# Patient Record
Sex: MALE | Race: WHITE | NOT HISPANIC OR LATINO | Employment: FULL TIME | URBAN - METROPOLITAN AREA
[De-identification: names, ages, dates, MRNs, and addresses within clinical notes are randomized per-mention and may not be internally consistent; named-entity substitution may affect disease eponyms.]

---

## 2017-01-20 ENCOUNTER — OFFICE VISIT (OUTPATIENT)
Dept: INTERNAL MEDICINE | Facility: MEDICAL CENTER | Age: 52
End: 2017-01-20
Payer: MEDICAID

## 2017-01-20 VITALS
WEIGHT: 254.6 LBS | HEART RATE: 70 BPM | SYSTOLIC BLOOD PRESSURE: 146 MMHG | RESPIRATION RATE: 20 BRPM | TEMPERATURE: 97.7 F | BODY MASS INDEX: 37.71 KG/M2 | HEIGHT: 69 IN | DIASTOLIC BLOOD PRESSURE: 100 MMHG | OXYGEN SATURATION: 93 %

## 2017-01-20 DIAGNOSIS — E66.9 OBESITY, UNSPECIFIED OBESITY SEVERITY, UNSPECIFIED OBESITY TYPE: ICD-10-CM

## 2017-01-20 DIAGNOSIS — I10 ESSENTIAL HYPERTENSION: ICD-10-CM

## 2017-01-20 DIAGNOSIS — Z13.1 DM (DIABETES MELLITUS SCREEN): ICD-10-CM

## 2017-01-20 DIAGNOSIS — Z72.0 TOBACCO ABUSE: ICD-10-CM

## 2017-01-20 PROCEDURE — 99204 OFFICE O/P NEW MOD 45 MIN: CPT | Mod: GC | Performed by: INTERNAL MEDICINE

## 2017-01-20 RX ORDER — LISINOPRIL 10 MG/1
10 TABLET ORAL DAILY
COMMUNITY
End: 2017-01-20 | Stop reason: SDUPTHER

## 2017-01-20 RX ORDER — AMLODIPINE BESYLATE 5 MG/1
5 TABLET ORAL DAILY
COMMUNITY
End: 2017-01-20 | Stop reason: SDUPTHER

## 2017-01-20 RX ORDER — AMLODIPINE BESYLATE 5 MG/1
5 TABLET ORAL DAILY
Qty: 30 TAB | Refills: 6 | Status: SHIPPED | OUTPATIENT
Start: 2017-01-20 | End: 2017-09-29

## 2017-01-20 RX ORDER — METOPROLOL SUCCINATE 200 MG/1
200 TABLET, EXTENDED RELEASE ORAL DAILY
Qty: 30 TAB | Refills: 6 | Status: SHIPPED | OUTPATIENT
Start: 2017-01-20 | End: 2017-09-29 | Stop reason: SDUPTHER

## 2017-01-20 RX ORDER — LISINOPRIL 10 MG/1
10 TABLET ORAL DAILY
Qty: 30 TAB | Refills: 6 | Status: SHIPPED | OUTPATIENT
Start: 2017-01-20 | End: 2017-09-29 | Stop reason: SDUPTHER

## 2017-01-20 RX ORDER — METOPROLOL SUCCINATE 200 MG/1
TABLET, EXTENDED RELEASE ORAL
COMMUNITY
Start: 2016-12-19 | End: 2017-01-20 | Stop reason: SDUPTHER

## 2017-01-20 RX ORDER — NICOTINE 21 MG/24HR
1 PATCH, TRANSDERMAL 24 HOURS TRANSDERMAL EVERY 24 HOURS
Qty: 30 PATCH | Refills: 3 | Status: SHIPPED | OUTPATIENT
Start: 2017-01-20 | End: 2017-09-29

## 2017-01-20 ASSESSMENT — ENCOUNTER SYMPTOMS
SHORTNESS OF BREATH: 0
BLURRED VISION: 0
DIAPHORESIS: 0
TINGLING: 0
HEADACHES: 0
CHILLS: 0
ABDOMINAL PAIN: 0
NECK PAIN: 0
DIZZINESS: 0
VOMITING: 0
FEVER: 0
MYALGIAS: 0
ORTHOPNEA: 0
COUGH: 0
BACK PAIN: 0
DEPRESSION: 0
NAUSEA: 0
SPUTUM PRODUCTION: 0
FOCAL WEAKNESS: 0

## 2017-01-20 ASSESSMENT — PATIENT HEALTH QUESTIONNAIRE - PHQ9: CLINICAL INTERPRETATION OF PHQ2 SCORE: 0

## 2017-01-20 NOTE — PROGRESS NOTES
New Patient to Establish    Reason to establish: HTN, DM    CC: Patient came to establish with us    HPI:   51-year-old male came to the clinic to establish with us.    Hypertension; patient was diagnosed with hypertension many years ago. He is not checking his blood pressure at home. He is  compliance with the medication. He is not doing exercise. He is on beta blocker, amlodipine and lisinopril. Denies any alarm symptoms.    Diabetes; patient states that he was diagnosed with diabetes one year ago. He is not checking his blood glucose at home. He is compliance with the medication. He is on metformin and tradjenta. He is not doing exercise. Denies any numbness in hands and feet. Patient states that eye exam was done recently for eyeglasses and they told him that there is no diabetic retinopathy.    Obesity; patient states that he lost around 40 pounds in 6 months. He is a . Currently he is not doing any exercise.    Tobacco use: One pack per day for 25 year. Patient states that he tried nicotine in the past and it didn't help him.  Patient Active Problem List    Diagnosis Date Noted   • Essential hypertension 01/20/2017   • DM (diabetes mellitus screen) 01/20/2017   • Tobacco abuse 01/20/2017   • Pneumonia 08/08/2013       Past Medical History   Diagnosis Date   • Hypertension    • Hernia, umbilical        Current Outpatient Prescriptions   Medication Sig Dispense Refill   • linagliptin (TRADJENTA) 5 MG Tab tablet Take 1 Tab by mouth every day. 30 Tab 6   • amlodipine (NORVASC) 5 MG Tab Take 1 Tab by mouth every day. 30 Tab 6   • metformin (GLUCOPHAGE) 500 MG Tab Take 1 Tab by mouth 2 times a day, with meals. 60 Tab 6   • lisinopril (PRINIVIL) 10 MG Tab Take 1 Tab by mouth every day. 30 Tab 6   • metoprolol (TOPROL-XL) 200 MG XL tablet Take 1 Tab by mouth every day. 30 Tab 6   • nicotine (NICODERM CQ) 21 MG/24HR PATCH 24 HR Apply 1 Patch to skin as directed every 24 hours. 30 Patch 3     No current  "facility-administered medications for this visit.       Allergies as of 01/20/2017   • (No Known Allergies)       Social History     Social History   • Marital Status:      Spouse Name: N/A   • Number of Children: N/A   • Years of Education: N/A     Occupational History   • Not on file.     Social History Main Topics   • Smoking status: Current Every Day Smoker -- 2.00 packs/day for 40 years     Types: Cigarettes   • Smokeless tobacco: Never Used   • Alcohol Use: Yes      Comment: occ   • Drug Use: No   • Sexual Activity: Not on file     Other Topics Concern   • Not on file     Social History Narrative       Family History   Problem Relation Age of Onset   • Heart Disease Father    • Stroke Father        History reviewed. No pertinent past surgical history.    ROS: As per HPI. Additional pertinent symptoms as noted below.    Review of Systems   Constitutional: Negative for fever, chills, malaise/fatigue and diaphoresis.   Eyes: Negative for blurred vision.   Respiratory: Negative for cough, sputum production and shortness of breath.    Cardiovascular: Negative for chest pain, orthopnea and leg swelling.   Gastrointestinal: Negative for nausea, vomiting and abdominal pain.   Genitourinary: Negative for dysuria and urgency.   Musculoskeletal: Negative for myalgias, back pain and neck pain.   Skin: Negative for itching and rash.   Neurological: Negative for dizziness, tingling, focal weakness and headaches.   Psychiatric/Behavioral: Negative for depression and suicidal ideas.       /100 mmHg  Pulse 70  Temp(Src) 36.5 °C (97.7 °F)  Resp 20  Ht 1.74 m (5' 8.5\")  Wt 115.486 kg (254 lb 9.6 oz)  BMI 38.14 kg/m2  SpO2 93%    Physical Exam  General:  Alert and oriented, No apparent distress.    Eyes: Pupils equal and reactive. No scleral icterus.    Throat: Clear no erythema or exudates noted.    Neck: Supple.     Lungs: Clear to auscultation and percussion bilaterally.    Cardiovascular: Regular rate and " rhythm. No murmurs, rubs or gallops.    Abdomen:  Benign. No rebound or guarding noted.    Extremities: No clubbing, cyanosis, edema.    Skin: Clear. No rash or suspicious skin lesions noted.     Neuro: Alert and oriented x3       Assessment and Plan    1. Essential hypertension  -Stable on this visit. Due to history of diabetes we'll try to keep his systolic blood pressure less than 140  -Patient denied any alarm symptoms on this visit  -Order CBC and chem panel  -Advised to check blood pressure daily and bring log to next appointment  -Advised to take low salt diet and exercise  -Continue beta blocker, lisinopril and amlodipine    2. DM diabetes mellitus screen)  -Advised to check blood glucose daily  -Advised to bring log to next appointment  -Continue metformin and Tradjenta  -Order CBC, chem panel, microalbumin creatinine ratio and lipid panel    3. Tobacco abuse  -Encouraged to stop smoking on this visit. Extensive counseling was provided. Started on nicotine patch. If nicotine patch did not help then will consider to  Start Chantix       4.Obesity  -Encourage for exercise . Counseling was provided on this visit. Advised to cut down carb and fat in diet           Signed by: Marshal Langford M.D.

## 2017-01-20 NOTE — MR AVS SNAPSHOT
"        Tomy Isaac   2017 10:45 AM   Office Visit   MRN: [id number]    Department:  [facility] - Internal Med   Dept Phone:  [phone]    Description:  Male : 1965   Provider:  Marshal Langford M.D.           Reason for Visit     Diabetes review medications    Hypertension get establish      Allergies as of 2017     No Known Allergies      You were diagnosed with     Essential hypertension   [4665484]       DM (diabetes mellitus screen)   [367187]       Tobacco abuse   [[id number]]         Vital Signs     Blood Pressure Pulse Temperature Respirations Height Weight    146/100 mmHg 70 36.5 °C (97.7 °F) 20 1.74 m (5' 8.5\") 115.486 kg (254 lb 9.6 oz)    Body Mass Index Oxygen Saturation Smoking Status             38.14 kg/m2 93% Current Every Day Smoker         Basic Information     Date Of Birth Sex Race Ethnicity Preferred Language    1965 Male White Non- English      Your appointments     2017 11:00 AM   Established Patient with Marshal Langford M.D.   [facility] - Internal Medicine (--)    [address] [id number] [phone]           You will be receiving a confirmation call a few days before your appointment from our automated call confirmation system.              Problem List              ICD-10-CM Priority Class Noted - Resolved    Pneumonia J18.9   2013 - Present    Essential hypertension I10   2017 - Present    DM (diabetes mellitus screen) Z13.1   2017 - Present    Tobacco abuse Z72.0   2017 - Present      Health Maintenance        Date Due Completion Dates    IMM DTaP/Tdap/Td Vaccine (1 - Tdap) 1984 ---    COLONOSCOPY 2015 ---    IMM INFLUENZA (1) 2016 ---            Current Immunizations     No immunizations on file.      Below and/or attached are the medications your provider expects you to take. Review all of your home medications and newly ordered medications with your provider and/or " pharmacist. Follow medication instructions as directed by your provider and/or pharmacist. Please keep your medication list with you and share with your provider. Update the information when medications are discontinued, doses are changed, or new medications (including over-the-counter products) are added; and carry medication information at all times in the event of emergency situations     Allergies:  No Known Allergies          Medications  Valid as of: January 20, 2017 - 11:30 AM    Generic Name Brand Name Tablet Size Instructions for use    AmLODIPine Besylate (Tab) NORVASC 5 MG Take 1 Tab by mouth every day.        Linagliptin (Tab) TRADJENTA 5 MG Take 1 Tab by mouth every day.        Lisinopril (Tab) PRINIVIL 10 MG Take 1 Tab by mouth every day.        MetFORMIN HCl (Tab) GLUCOPHAGE 500 MG Take 1 Tab by mouth 2 times a day, with meals.        Metoprolol Succinate (TABLET SR 24 HR) TOPROL- MG Take 1 Tab by mouth every day.        Nicotine (PATCH 24 HR) NICODERM 21 MG/24HR Apply 1 Patch to skin as directed every 24 hours.        .                 Medicines prescribed today were sent to:     [facility] [address] [place] [address], [place] - [address]    [address] [facility] [place] [address]    Phone: [phone] Fax: [phone]    Open 24 Hours?: No      Medication refill instructions:       If your prescription bottle indicates you have medication refills left, it is not necessary to call your provider’s office. Please contact your pharmacy and they will refill your medication.    If your prescription bottle indicates you do not have any refills left, you may request refills at any time through one of the following ways: The online [employer] system (except Urgent Care), by calling your provider’s office, or by asking your pharmacy to contact your provider’s office with a refill request. Medication refills are processed only during regular business hours and may not be available until the next business day.  Your provider may request additional information or to have a follow-up visit with you prior to refilling your medication.   *Please Note: Medication refills are assigned a new Rx number when refilled electronically. Your pharmacy may indicate that no refills were authorized even though a new prescription for the same medication is available at the pharmacy. Please request the medicine by name with the pharmacy before contacting your provider for a refill.        Your To Do List     Future Labs/Procedures Complete By Expires    CBC WITH DIFFERENTIAL  As directed 1/20/2018    COMP METABOLIC PANEL  As directed 1/20/2018    HEMOGLOBIN A1C  As directed 1/20/2018    MICROALBUMIN CREAT RATIO URINE  As directed 1/20/2018         [email or web address] Access Code: Activation code not generated  Current [employer] Status: Active

## 2017-09-29 ENCOUNTER — OFFICE VISIT (OUTPATIENT)
Dept: INTERNAL MEDICINE | Facility: MEDICAL CENTER | Age: 52
End: 2017-09-29

## 2017-09-29 VITALS
HEART RATE: 71 BPM | HEIGHT: 68 IN | SYSTOLIC BLOOD PRESSURE: 150 MMHG | DIASTOLIC BLOOD PRESSURE: 99 MMHG | OXYGEN SATURATION: 94 % | BODY MASS INDEX: 38.98 KG/M2 | TEMPERATURE: 97.9 F | WEIGHT: 257.2 LBS

## 2017-09-29 DIAGNOSIS — E66.9 OBESITY (BMI 35.0-39.9 WITHOUT COMORBIDITY): ICD-10-CM

## 2017-09-29 DIAGNOSIS — Z72.0 TOBACCO ABUSE: ICD-10-CM

## 2017-09-29 DIAGNOSIS — I10 ESSENTIAL HYPERTENSION: ICD-10-CM

## 2017-09-29 DIAGNOSIS — Z13.1 DM (DIABETES MELLITUS SCREEN): ICD-10-CM

## 2017-09-29 DIAGNOSIS — E66.9 OBESITY, UNSPECIFIED OBESITY SEVERITY, UNSPECIFIED OBESITY TYPE: ICD-10-CM

## 2017-09-29 PROCEDURE — 99204 OFFICE O/P NEW MOD 45 MIN: CPT | Mod: GC | Performed by: INTERNAL MEDICINE

## 2017-09-29 RX ORDER — LISINOPRIL 10 MG/1
10 TABLET ORAL DAILY
Qty: 30 TAB | Refills: 6 | Status: SHIPPED | OUTPATIENT
Start: 2017-09-29

## 2017-09-29 RX ORDER — METOPROLOL SUCCINATE 200 MG/1
200 TABLET, EXTENDED RELEASE ORAL DAILY
Qty: 30 TAB | Refills: 6 | Status: SHIPPED | OUTPATIENT
Start: 2017-09-29

## 2017-09-29 ASSESSMENT — PAIN SCALES - GENERAL: PAINLEVEL: NO PAIN

## 2017-09-29 NOTE — PROGRESS NOTES
New Patient to Establish    Reason to establish: New patient to establish    CC: Establish care, hypertension    HPI: Mr. Isaac is a 52-year-old male who presents to the clinic with his daughter to establish care with a new physician, patient states he was being followed at [place] with another PCP however PCP has retired. Patient was previously being treated for hypertension and diabetes mellitus type II.    Patient states that he does not have insurance and is self-pay, this has created some difficulties for him to obtain certain medications that were prescribed to him. However, patient states that he has been taking metoprolol, lisinopril, and metformin, he states that his blood pressure normally ranges in the 130s to 150s, systolic, and his blood sugar normally ranges in the 150's.     Patient does not have any recent labs, and his blood pressure on this visit was 150/99.    Patient is a .    Patient states that he currently smokes about a pack a day and has been smoking since he was 10 years old. Patient's daughter also smokes.    Patient states that he has no other medical complaints at this time.     As previously stated patient is self-pay, this has brought up difficulties in treating this patient. Patient states that he cannot follow up in one month as I previously suggested and will follow-up in 2 months. Patient also states that it might be difficult for him to obtain labs due to his financial situation.    Patient Active Problem List    Diagnosis Date Noted   • Essential hypertension 01/20/2017   • DM (diabetes mellitus screen) 01/20/2017   • Tobacco abuse 01/20/2017   • Obesity 01/20/2017       Past Medical History:   Diagnosis Date   • Hernia, umbilical    • Hypertension        Current Outpatient Prescriptions   Medication Sig Dispense Refill   • metoprolol (TOPROL-XL) 200 MG XL tablet Take 1 Tab by mouth every day. 30 Tab 6   • lisinopril (PRINIVIL) 10 MG Tab Take 1 Tab by mouth  "every day. 30 Tab 6   • metformin (GLUCOPHAGE) 500 MG Tab Take 1 Tab by mouth 2 times a day, with meals. 60 Tab 6     No current facility-administered medications for this visit.        Allergies as of 09/29/2017   • (No Known Allergies)       Social History     Social History   • Marital status:      Spouse name: N/A   • Number of children: N/A   • Years of education: N/A     Occupational History   • Not on file.     Social History Main Topics   • Smoking status: Current Every Day Smoker     Packs/day: 1.00     Years: 40.00     Types: Cigarettes   • Smokeless tobacco: Never Used   • Alcohol use Yes      Comment: occ   • Drug use: No   • Sexual activity: Not Currently     Other Topics Concern   • Not on file     Social History Narrative   • No narrative on file       Family History   Problem Relation Age of Onset   • Heart Disease Father    • Stroke Father    • Diabetes Father        No past surgical history on file.    ROS: As per HPI. Additional pertinent symptoms as noted below.    All others negative    /99   Pulse 71   Temp 36.6 °C (97.9 °F)   Ht 1.727 m (5' 8\")   Wt 116.7 kg (257 lb 3.2 oz)   SpO2 94%   BMI 39.11 kg/m²     Physical Exam  General:  Alert and oriented, No apparent distress.    Eyes: Pupils equal and reactive. No scleral icterus.    Throat: Clear no erythema or exudates noted.    Neck: Supple. No lymphadenopathy noted. Thyroid not enlarged.    Lungs: Clear to auscultation and percussion bilaterally.    Cardiovascular: Regular rate and rhythm. No murmurs, rubs or gallops.    Abdomen:  Benign. No rebound or guarding noted.    Extremities: No clubbing, cyanosis, edema.    Skin: Clear. No rash or suspicious skin lesions noted.    Other:    Note: I have reviewed all pertinent labs and diagnostic tests associated with this visit with specific comments listed under the assessment and plan below    Assessment and Plan    1. Hypertension  Patient states he has a history of hypertension " for which he was being followed by his previous PCP, blood pressure on this visit was 150/99.  Plan  -Refill patient's lisinopril 10 mg and metoprolol 200 mg.  -Patient was counseled to stop smoking  -Patient was asked to have lipid panel and CMP drawn.  -Patient to follow-up in  2 months    2. Obesity  Patient has a BMI of 38.15.  Plan  -Patient was counseled on weight loss.    3. Tobacco use  Patient states that he has been smoking at least one pack a day for the previous 40 years. He was counseled today on tobacco use cessation.    4. Healthcare maintenance  Patient was asked to have a CBC, CMP, lipid panel, and hemoglobin A1c drawn. Patient denied vaccinations at this time due to money constraints.    5. Diabetes mellitus type II  Patient states that he is diabetic and was told that he had type II, was previously being treated with metformin. Patient states that his blood glucose normally ranges 150 with metformin.  Plan  -Since there are no labs to verify blood glucose patient was asked to have a hemoglobin A1c drawn and to follow-up in 2 months.      There are no diagnoses linked to this encounter.    Followup: Return in about 2 months (around 11/29/2017), or if symptoms worsen or fail to improve, for Short.    Risk Assessment (discuss potential complications a function of chronic problems): Patient seems to be higher risk due to obesity, hypertension, diabetes, and tobacco use    Complexity (discuss number of co-morbidities): Patient has several comorbidities as stated previously    Signed by: Jesus Thomas M.D.

## 2017-10-26 ENCOUNTER — HOSPITAL ENCOUNTER (EMERGENCY)
Facility: MEDICAL CENTER | Age: 52
End: 2017-10-27
Attending: EMERGENCY MEDICINE

## 2017-10-26 ENCOUNTER — APPOINTMENT (OUTPATIENT)
Dept: RADIOLOGY | Facility: MEDICAL CENTER | Age: 52
End: 2017-10-26
Attending: EMERGENCY MEDICINE

## 2017-10-26 DIAGNOSIS — N20.1 URETEROLITHIASIS: ICD-10-CM

## 2017-10-26 LAB
ALBUMIN SERPL BCP-MCNC: 4 G/DL (ref 3.2–4.9)
ALBUMIN/GLOB SERPL: 1.5 G/DL
ALP SERPL-CCNC: 68 U/L (ref 30–99)
ALT SERPL-CCNC: 20 U/L (ref 2–50)
ANION GAP SERPL CALC-SCNC: 8 MMOL/L (ref 0–11.9)
AST SERPL-CCNC: 20 U/L (ref 12–45)
BASOPHILS # BLD AUTO: 0.5 % (ref 0–1.8)
BASOPHILS # BLD: 0.06 K/UL (ref 0–0.12)
BILIRUB SERPL-MCNC: 1 MG/DL (ref 0.1–1.5)
BUN SERPL-MCNC: 14 MG/DL (ref 8–22)
CALCIUM SERPL-MCNC: 8.7 MG/DL (ref 8.4–10.2)
CHLORIDE SERPL-SCNC: 109 MMOL/L (ref 96–112)
CO2 SERPL-SCNC: 23 MMOL/L (ref 20–33)
CREAT SERPL-MCNC: 1.25 MG/DL (ref 0.5–1.4)
EOSINOPHIL # BLD AUTO: 0.2 K/UL (ref 0–0.51)
EOSINOPHIL NFR BLD: 1.5 % (ref 0–6.9)
ERYTHROCYTE [DISTWIDTH] IN BLOOD BY AUTOMATED COUNT: 39.5 FL (ref 35.9–50)
GFR SERPL CREATININE-BSD FRML MDRD: >60 ML/MIN/1.73 M 2
GLOBULIN SER CALC-MCNC: 2.6 G/DL (ref 1.9–3.5)
GLUCOSE SERPL-MCNC: 153 MG/DL (ref 65–99)
HCT VFR BLD AUTO: 49.8 % (ref 42–52)
HGB BLD-MCNC: 17.9 G/DL (ref 14–18)
IMM GRANULOCYTES # BLD AUTO: 0.04 K/UL (ref 0–0.11)
IMM GRANULOCYTES NFR BLD AUTO: 0.3 % (ref 0–0.9)
LIPASE SERPL-CCNC: 25 U/L (ref 7–58)
LYMPHOCYTES # BLD AUTO: 1.51 K/UL (ref 1–4.8)
LYMPHOCYTES NFR BLD: 11.6 % (ref 22–41)
MCH RBC QN AUTO: 31.7 PG (ref 27–33)
MCHC RBC AUTO-ENTMCNC: 35.9 G/DL (ref 33.7–35.3)
MCV RBC AUTO: 88.3 FL (ref 81.4–97.8)
MONOCYTES # BLD AUTO: 0.56 K/UL (ref 0–0.85)
MONOCYTES NFR BLD AUTO: 4.3 % (ref 0–13.4)
NEUTROPHILS # BLD AUTO: 10.69 K/UL (ref 1.82–7.42)
NEUTROPHILS NFR BLD: 81.8 % (ref 44–72)
NRBC # BLD AUTO: 0 K/UL
NRBC BLD AUTO-RTO: 0 /100 WBC
PLATELET # BLD AUTO: 241 K/UL (ref 164–446)
PMV BLD AUTO: 10.2 FL (ref 9–12.9)
POTASSIUM SERPL-SCNC: 4.1 MMOL/L (ref 3.6–5.5)
PROT SERPL-MCNC: 6.6 G/DL (ref 6–8.2)
RBC # BLD AUTO: 5.64 M/UL (ref 4.7–6.1)
SODIUM SERPL-SCNC: 140 MMOL/L (ref 135–145)
TROPONIN I SERPL-MCNC: <0.02 NG/ML (ref 0–0.04)
WBC # BLD AUTO: 13.1 K/UL (ref 4.8–10.8)

## 2017-10-26 PROCEDURE — 85025 COMPLETE CBC W/AUTO DIFF WBC: CPT

## 2017-10-26 PROCEDURE — 96374 THER/PROPH/DIAG INJ IV PUSH: CPT

## 2017-10-26 PROCEDURE — 700102 HCHG RX REV CODE 250 W/ 637 OVERRIDE(OP): Performed by: EMERGENCY MEDICINE

## 2017-10-26 PROCEDURE — 71010 DX-CHEST-PORTABLE (1 VIEW): CPT

## 2017-10-26 PROCEDURE — 700117 HCHG RX CONTRAST REV CODE 255: Performed by: EMERGENCY MEDICINE

## 2017-10-26 PROCEDURE — 94760 N-INVAS EAR/PLS OXIMETRY 1: CPT

## 2017-10-26 PROCEDURE — 93005 ELECTROCARDIOGRAM TRACING: CPT | Performed by: EMERGENCY MEDICINE

## 2017-10-26 PROCEDURE — 84484 ASSAY OF TROPONIN QUANT: CPT

## 2017-10-26 PROCEDURE — 81001 URINALYSIS AUTO W/SCOPE: CPT

## 2017-10-26 PROCEDURE — 80053 COMPREHEN METABOLIC PANEL: CPT

## 2017-10-26 PROCEDURE — 83690 ASSAY OF LIPASE: CPT

## 2017-10-26 PROCEDURE — 74177 CT ABD & PELVIS W/CONTRAST: CPT

## 2017-10-26 PROCEDURE — 700111 HCHG RX REV CODE 636 W/ 250 OVERRIDE (IP): Performed by: EMERGENCY MEDICINE

## 2017-10-26 PROCEDURE — 36415 COLL VENOUS BLD VENIPUNCTURE: CPT

## 2017-10-26 PROCEDURE — 96375 TX/PRO/DX INJ NEW DRUG ADDON: CPT

## 2017-10-26 PROCEDURE — A9270 NON-COVERED ITEM OR SERVICE: HCPCS | Performed by: EMERGENCY MEDICINE

## 2017-10-26 PROCEDURE — 93005 ELECTROCARDIOGRAM TRACING: CPT

## 2017-10-26 PROCEDURE — 99285 EMERGENCY DEPT VISIT HI MDM: CPT

## 2017-10-26 RX ORDER — ONDANSETRON 2 MG/ML
4 INJECTION INTRAMUSCULAR; INTRAVENOUS ONCE
Status: COMPLETED | OUTPATIENT
Start: 2017-10-26 | End: 2017-10-26

## 2017-10-26 RX ORDER — TAMSULOSIN HYDROCHLORIDE 0.4 MG/1
0.4 CAPSULE ORAL ONCE
Status: COMPLETED | OUTPATIENT
Start: 2017-10-27 | End: 2017-10-26

## 2017-10-26 RX ORDER — MORPHINE SULFATE 4 MG/ML
4 INJECTION, SOLUTION INTRAMUSCULAR; INTRAVENOUS ONCE
Status: COMPLETED | OUTPATIENT
Start: 2017-10-26 | End: 2017-10-26

## 2017-10-26 RX ORDER — KETOROLAC TROMETHAMINE 30 MG/ML
30 INJECTION, SOLUTION INTRAMUSCULAR; INTRAVENOUS ONCE
Status: DISCONTINUED | OUTPATIENT
Start: 2017-10-27 | End: 2017-10-27 | Stop reason: HOSPADM

## 2017-10-26 RX ORDER — KETOROLAC TROMETHAMINE 30 MG/ML
30 INJECTION, SOLUTION INTRAMUSCULAR; INTRAVENOUS ONCE
Status: DISCONTINUED | OUTPATIENT
Start: 2017-10-27 | End: 2017-10-26

## 2017-10-26 RX ORDER — ONDANSETRON 4 MG/1
4 TABLET, ORALLY DISINTEGRATING ORAL ONCE
Status: COMPLETED | OUTPATIENT
Start: 2017-10-27 | End: 2017-10-26

## 2017-10-26 RX ADMIN — TAMSULOSIN HYDROCHLORIDE 0.4 MG: 0.4 CAPSULE ORAL at 23:38

## 2017-10-26 RX ADMIN — ONDANSETRON 4 MG: 4 TABLET, ORALLY DISINTEGRATING ORAL at 23:38

## 2017-10-26 RX ADMIN — IOHEXOL 100 ML: 350 INJECTION, SOLUTION INTRAVENOUS at 23:23

## 2017-10-26 RX ADMIN — ONDANSETRON 4 MG: 2 INJECTION, SOLUTION INTRAMUSCULAR; INTRAVENOUS at 22:00

## 2017-10-26 RX ADMIN — HYDROMORPHONE HYDROCHLORIDE 0.5 MG: 1 INJECTION, SOLUTION INTRAMUSCULAR; INTRAVENOUS; SUBCUTANEOUS at 23:41

## 2017-10-26 RX ADMIN — MORPHINE SULFATE 4 MG: 4 INJECTION INTRAVENOUS at 22:00

## 2017-10-26 ASSESSMENT — PAIN SCALES - GENERAL
PAINLEVEL_OUTOF10: 10
PAINLEVEL_OUTOF10: 6

## 2017-10-27 VITALS
WEIGHT: 258.16 LBS | HEART RATE: 67 BPM | OXYGEN SATURATION: 92 % | TEMPERATURE: 98.2 F | RESPIRATION RATE: 17 BRPM | DIASTOLIC BLOOD PRESSURE: 121 MMHG | BODY MASS INDEX: 38.24 KG/M2 | SYSTOLIC BLOOD PRESSURE: 185 MMHG | HEIGHT: 69 IN

## 2017-10-27 LAB
APPEARANCE UR: ABNORMAL
BILIRUB UR QL STRIP.AUTO: NEGATIVE
COLOR UR: YELLOW
EPI CELLS #/AREA URNS HPF: ABNORMAL /HPF
GLUCOSE UR STRIP.AUTO-MCNC: NEGATIVE MG/DL
KETONES UR STRIP.AUTO-MCNC: NEGATIVE MG/DL
LEUKOCYTE ESTERASE UR QL STRIP.AUTO: NEGATIVE
MICRO URNS: ABNORMAL
MUCOUS THREADS #/AREA URNS HPF: ABNORMAL /HPF
NITRITE UR QL STRIP.AUTO: NEGATIVE
PH UR STRIP.AUTO: 5.5 [PH]
PROT UR QL STRIP: 100 MG/DL
RBC # URNS HPF: >150 /HPF
RBC UR QL AUTO: ABNORMAL
SP GR UR REFRACTOMETRY: 1.03
UNIDENT CRYS URNS QL MICRO: ABNORMAL /HPF
WBC #/AREA URNS HPF: ABNORMAL /HPF

## 2017-10-27 RX ORDER — HYDROCODONE BITARTRATE AND ACETAMINOPHEN 5; 325 MG/1; MG/1
1-2 TABLET ORAL EVERY 4 HOURS PRN
Qty: 20 TAB | Refills: 0 | Status: SHIPPED | OUTPATIENT
Start: 2017-10-27 | End: 2020-05-26

## 2017-10-27 RX ORDER — TAMSULOSIN HYDROCHLORIDE 0.4 MG/1
0.4 CAPSULE ORAL DAILY
Qty: 30 CAP | Refills: 0 | Status: SHIPPED | OUTPATIENT
Start: 2017-10-27 | End: 2020-05-26

## 2017-10-27 RX ORDER — IBUPROFEN 600 MG/1
600 TABLET ORAL EVERY 6 HOURS PRN
Qty: 30 TAB | Refills: 0 | Status: SHIPPED | OUTPATIENT
Start: 2017-10-27 | End: 2017-11-03

## 2017-10-27 RX ORDER — ONDANSETRON 8 MG/1
8 TABLET, ORALLY DISINTEGRATING ORAL EVERY 8 HOURS PRN
Qty: 15 TAB | Refills: 0 | Status: SHIPPED | OUTPATIENT
Start: 2017-10-27 | End: 2020-05-26

## 2017-10-27 NOTE — DISCHARGE INSTRUCTIONS
Kidney Stones  Kidney stones (urolithiasis) are solid masses that form inside your kidneys. The intense pain is caused by the stone moving through the kidney, ureter, bladder, and urethra (urinary tract). When the stone moves, the ureter starts to spasm around the stone. The stone is usually passed in your pee (urine).   HOME CARE  · Drink enough fluids to keep your pee clear or pale yellow. This helps to get the stone out.  · Strain all pee through the provided strainer. Do not pee without peeing through the strainer, not even once. If you pee the stone out, catch it in the strainer. The stone may be as small as a grain of salt. Take this to your doctor. This will help your doctor figure out what you can do to try to prevent more kidney stones.  · Only take medicine as told by your doctor.  · Follow up with your doctor as told.  · Get follow-up X-rays as told by your doctor.  GET HELP IF:  You have pain that gets worse even if you have been taking pain medicine.  GET HELP RIGHT AWAY IF:   · Your pain does not get better with medicine.  · You have a fever or shaking chills.  · Your pain increases and gets worse over 18 hours.  · You have new belly (abdominal) pain.  · You feel faint or pass out.  · You are unable to pee.  MAKE SURE YOU:   · Understand these instructions.  · Will watch your condition.  · Will get help right away if you are not doing well or get worse.     This information is not intended to replace advice given to you by your health care provider. Make sure you discuss any questions you have with your health care provider.     Document Released: 06/05/2009 Document Revised: 08/20/2014 Document Reviewed: 05/21/2014  [employer] Patient Education ©2016 [employer]    Urine Strainer  This strainer is used to catch or filter out any stones found in your urine. Place the strainer under your urine stream. Save any stones or objects that you find in your urine. Place them in a plastic or glass  container to show your caregiver. The stones vary in size - some can be very small, so make sure you check the strainer carefully. Your caregiver may send the stone to the lab. When the results are back, your caregiver may recommend medicines or diet changes.   Document Released: 09/22/2005 Document Revised: 03/11/2013 Document Reviewed: 10/30/2009  [employer]® Patient Information ©2014 [employer].

## 2017-10-27 NOTE — ED NOTES
Urine strainer provided, pt reports that he has a safe ride home with daughter. Discharge information provided. Pt verbalized understanding of discharge instructions to follow up with Urology, PCP and to return to ER if condition worsens. Pt expressed the awareness of not driving or operating heavy machinery. Pt ambulated out of ER in a steady gait.

## 2017-10-27 NOTE — ED NOTES
Pt ambulates to triage w/ steady gait. A & O, family at side.    Pt states N/V, 10/10 lower abdominal pain started about 2 hours ago. Pt states pain radiates to back.    Pt appears to be in pain. Moving to find position of comfort, grunting.    Pt states history of high blood pressure but states he is compliant with all prescribed meds.

## 2017-10-27 NOTE — ED PROVIDER NOTES
"ED Provider Note    CHIEF COMPLAINT  Chief Complaint   Patient presents with   • LLQ Pain   • Low Back Pain   • N/V       HPI  Tomy Isaac is a 52 y.o. male who presents To the emergency department complaining of left flank pain. Past medical history significant for diabetes and hypertension. He says that he is compliant with his medication regimen. He does have upcoming annual check this week. On sure of prior hemoglobin A1c. He does admit to approximately one pack of cigarettes per day. No alcohol. Denies illicit drug abuse.  He had onset of his left flank pain approximately 3 hours ago. It started out with significant severity about 10 out of 10 and has been persistent and unchanged since then. No aggravating or relieving factors. No waxing or waning. No urinary or stool changes. No fever or chills. No chest pain or palpitations or shortness of breath. No diaphoresis.    REVIEW OF SYSTEMS  See HPI for further details. All other systems are negative.     PAST MEDICAL HISTORY   has a past medical history of Hernia, umbilical and Hypertension.    SOCIAL HISTORY  Social History     Social History Main Topics   • Smoking status: Current Every Day Smoker     Packs/day: 1.00     Years: 40.00     Types: Cigarettes   • Smokeless tobacco: Never Used   • Alcohol use Yes      Comment: occ   • Drug use: No   • Sexual activity: Not Currently       SURGICAL HISTORY  patient denies any surgical history    CURRENT MEDICATIONS  Home Medications    **Home medications have not yet been reviewed for this encounter**         ALLERGIES  No Known Allergies    PHYSICAL EXAM  VITAL SIGNS: BP (!) 185/121   Pulse 76   Temp 36.8 °C (98.2 °F)   Resp 17   Ht 1.753 m (5' 9\")   Wt 117.1 kg (258 lb 2.5 oz)   SpO2 95%   BMI 38.12 kg/m²  @[id number]::@   Pulse ox interpretation: I interpret this pulse ox as normal.  Constitutional: Alert inModerate pain apparent distress.  HENT: No signs of trauma, Bilateral external ears normal, Nose " normal.   Eyes: Pupils are equal and reactive, Conjunctiva normal, Non-icteric.   Neck: Normal range of motion, No tenderness, Supple, No stridor.   Lymphatic: No lymphadenopathy noted.   Cardiovascular: Regular rate and rhythm, no murmurs.   Thorax & Lungs: Normal breath sounds, No respiratory distress, No wheezing, No chest tenderness.   Abdomen: Bowel sounds normal, Soft, left lower quadrant tenderness, No masses, No pulsatile masses. No peritoneal signs.  Skin: Warm, Dry, No erythema, No rash.   Back: No bony tenderness, left CVA tenderness.   Extremities: Intact distal pulses, No edema, No tenderness, No cyanosis,  Negative Brandan's sign.   Musculoskeletal: Good range of motion in all major joints. No tenderness to palpation or major deformities noted.   Neurologic: Alert , Normal motor function, Normal sensory function, No focal deficits noted.   Psychiatric: Affect normal, Judgment normal, Mood normal.       DIAGNOSTIC STUDIES / PROCEDURES    EKG  Sinus rhythm at a rate of 68, normal axis, normal intervals, T-wave morphology suggestive of left atrial enlargement, no acute ST changes.    LABS  Results for orders placed or performed during the hospital encounter of 10/26/17   CBC WITH DIFFERENTIAL   Result Value Ref Range    WBC 13.1 (H) 4.8 - 10.8 K/uL    RBC 5.64 4.70 - 6.10 M/uL    Hemoglobin 17.9 14.0 - 18.0 g/dL    Hematocrit 49.8 42.0 - 52.0 %    MCV 88.3 81.4 - 97.8 fL    MCH 31.7 27.0 - 33.0 pg    MCHC 35.9 (H) 33.7 - 35.3 g/dL    RDW 39.5 35.9 - 50.0 fL    Platelet Count 241 164 - 446 K/uL    MPV 10.2 9.0 - 12.9 fL    Neutrophils-Polys 81.80 (H) 44.00 - 72.00 %    Lymphocytes 11.60 (L) 22.00 - 41.00 %    Monocytes 4.30 0.00 - 13.40 %    Eosinophils 1.50 0.00 - 6.90 %    Basophils 0.50 0.00 - 1.80 %    Immature Granulocytes 0.30 0.00 - 0.90 %    Nucleated RBC 0.00 /100 WBC    Neutrophils (Absolute) 10.69 (H) 1.82 - 7.42 K/uL    Lymphs (Absolute) 1.51 1.00 - 4.80 K/uL    Monos (Absolute) 0.56 0.00 - 0.85  K/uL    Eos (Absolute) 0.20 0.00 - 0.51 K/uL    Baso (Absolute) 0.06 0.00 - 0.12 K/uL    Immature Granulocytes (abs) 0.04 0.00 - 0.11 K/uL    NRBC (Absolute) 0.00 K/uL   COMP METABOLIC PANEL   Result Value Ref Range    Sodium 140 135 - 145 mmol/L    Potassium 4.1 3.6 - 5.5 mmol/L    Chloride 109 96 - 112 mmol/L    Co2 23 20 - 33 mmol/L    Anion Gap 8.0 0.0 - 11.9    Glucose 153 (H) 65 - 99 mg/dL    Bun 14 8 - 22 mg/dL    Creatinine 1.25 0.50 - 1.40 mg/dL    Calcium 8.7 8.4 - 10.2 mg/dL    AST(SGOT) 20 12 - 45 U/L    ALT(SGPT) 20 2 - 50 U/L    Alkaline Phosphatase 68 30 - 99 U/L    Total Bilirubin 1.0 0.1 - 1.5 mg/dL    Albumin 4.0 3.2 - 4.9 g/dL    Total Protein 6.6 6.0 - 8.2 g/dL    Globulin 2.6 1.9 - 3.5 g/dL    A-G Ratio 1.5 g/dL   LIPASE   Result Value Ref Range    Lipase 25 7 - 58 U/L   TROPONIN   Result Value Ref Range    Troponin I <0.02 0.00 - 0.04 ng/mL   ESTIMATED GFR   Result Value Ref Range    GFR If African American >60 >60 mL/min/1.73 m 2    GFR If Non African American >60 >60 mL/min/1.73 m 2   URINALYSIS   Result Value Ref Range    Micro Urine Req Microscopic     Color Yellow     Character Hazy (A)     Ph 5.5 5.0 - 8.0    Glucose Negative Negative mg/dL    Ketones Negative Negative mg/dL    Protein 100 (A) Negative mg/dL    Bilirubin Negative Negative    Nitrite Negative Negative    Leukocyte Esterase Negative Negative    Occult Blood Large (A) Negative   REFRACTOMETER SG   Result Value Ref Range    Specific Gravity 1.034    URINE MICROSCOPIC (W/UA)   Result Value Ref Range    WBC 0-2 (A) /hpf    RBC >150 (A) /hpf    Epithelial Cells Few Few /hpf    Mucous Threads Few /hpf    Urine Crystals Few Uric Acid /hpf         RADIOLOGY  CT-ABDOMEN-PELVIS WITH   Final Result      1.  Obstructing linear calculus in the mid left ureter results in mild hydroureteronephrosis, perinephric stranding, and a delayed nephrogram.      2.  Diverticulosis.      3.  Umbilical hernia containing fat      DX-CHEST-PORTABLE (1  VIEW)   Final Result      No acute cardiopulmonary findings.              COURSE & MEDICAL DECISION MAKING  Pertinent Labs & Imaging studies reviewed. (See chart for details)  Patient presented to emergency with left flank pain. Initial considerations including vascular, GI as well as renal. The patient did note that the pain was constant which was the largest contribute factor to slight decrease suspicion of renal calculi and thus CT imaging was completed with contrast. Ultimately however imaging proved that this was an obstructing calculus in the mid left ureter. There is no urinary tract infection on urinalysis. Remaining lab evaluation/unremarkable other than elevated glucose in a known diabetic. Patient has had adequate pain control here in the ER. I will discharge him on Vicodin, NSAIDs, Flomax, Zofran for symptom control. He has been referred back to his primary care physician as well as Dr. Cristobal with urology on-call. He has been provided with urine strainer to hopefully collect stone for further analysis. He is understanding of the procedure for this. He has any return precautions ER if needed as well.       The patient will not drink alcohol nor drive with prescribed medications. The patient will return for worsening symptoms and is stable at the time of discharge. The patient verbalizes understanding and will comply.    FINAL IMPRESSION  1. Ureterolithiasis    2. Hyperglycemia with known diabetes        Electronically signed by: Jaron Michaels, 10/26/2017 10:18 PM

## 2017-10-27 NOTE — ED NOTES
ERP at bedside. Pt agrees with plan of care discussed by ERP. AIDET acknowledged with patient. Alyssa in low position, side rail up for pt safety. Call light within reach. Will continue to monitor.

## 2017-10-28 ENCOUNTER — APPOINTMENT (OUTPATIENT)
Dept: RADIOLOGY | Facility: MEDICAL CENTER | Age: 52
End: 2017-10-28
Attending: EMERGENCY MEDICINE

## 2017-10-28 ENCOUNTER — HOSPITAL ENCOUNTER (EMERGENCY)
Facility: MEDICAL CENTER | Age: 52
End: 2017-10-29
Attending: EMERGENCY MEDICINE

## 2017-10-28 DIAGNOSIS — Z72.0 TOBACCO ABUSE: ICD-10-CM

## 2017-10-28 DIAGNOSIS — I10 ESSENTIAL HYPERTENSION: ICD-10-CM

## 2017-10-28 DIAGNOSIS — E66.9 OBESITY, UNSPECIFIED CLASSIFICATION, UNSPECIFIED OBESITY TYPE, UNSPECIFIED WHETHER SERIOUS COMORBIDITY PRESENT: ICD-10-CM

## 2017-10-28 DIAGNOSIS — N12 PYELONEPHRITIS: ICD-10-CM

## 2017-10-28 DIAGNOSIS — E11.8 TYPE 2 DIABETES MELLITUS WITH COMPLICATION, WITHOUT LONG-TERM CURRENT USE OF INSULIN (HCC): ICD-10-CM

## 2017-10-28 DIAGNOSIS — N20.0 KIDNEY STONE: ICD-10-CM

## 2017-10-28 LAB
ALBUMIN SERPL BCP-MCNC: 4.2 G/DL (ref 3.2–4.9)
ALBUMIN/GLOB SERPL: 1.3 G/DL
ALP SERPL-CCNC: 78 U/L (ref 30–99)
ALT SERPL-CCNC: 14 U/L (ref 2–50)
ANION GAP SERPL CALC-SCNC: 10 MMOL/L (ref 0–11.9)
APPEARANCE UR: ABNORMAL
AST SERPL-CCNC: 16 U/L (ref 12–45)
BACTERIA #/AREA URNS HPF: ABNORMAL /HPF
BASOPHILS # BLD AUTO: 0.5 % (ref 0–1.8)
BASOPHILS # BLD: 0.08 K/UL (ref 0–0.12)
BILIRUB SERPL-MCNC: 0.8 MG/DL (ref 0.1–1.5)
BILIRUB UR QL STRIP.AUTO: NEGATIVE
BNP SERPL-MCNC: 162 PG/ML (ref 0–100)
BUN SERPL-MCNC: 24 MG/DL (ref 8–22)
CALCIUM SERPL-MCNC: 9.2 MG/DL (ref 8.4–10.2)
CHLORIDE SERPL-SCNC: 104 MMOL/L (ref 96–112)
CO2 SERPL-SCNC: 24 MMOL/L (ref 20–33)
COLOR UR: YELLOW
CREAT SERPL-MCNC: 1.42 MG/DL (ref 0.5–1.4)
CULTURE IF INDICATED INDCX: YES UA CULTURE
EOSINOPHIL # BLD AUTO: 0.21 K/UL (ref 0–0.51)
EOSINOPHIL NFR BLD: 1.4 % (ref 0–6.9)
EPI CELLS #/AREA URNS HPF: ABNORMAL /HPF
ERYTHROCYTE [DISTWIDTH] IN BLOOD BY AUTOMATED COUNT: 39.5 FL (ref 35.9–50)
GFR SERPL CREATININE-BSD FRML MDRD: 52 ML/MIN/1.73 M 2
GLOBULIN SER CALC-MCNC: 3.3 G/DL (ref 1.9–3.5)
GLUCOSE SERPL-MCNC: 146 MG/DL (ref 65–99)
GLUCOSE UR STRIP.AUTO-MCNC: NEGATIVE MG/DL
HCT VFR BLD AUTO: 47.5 % (ref 42–52)
HGB BLD-MCNC: 17.1 G/DL (ref 14–18)
IMM GRANULOCYTES # BLD AUTO: 0.05 K/UL (ref 0–0.11)
IMM GRANULOCYTES NFR BLD AUTO: 0.3 % (ref 0–0.9)
KETONES UR STRIP.AUTO-MCNC: NEGATIVE MG/DL
LACTATE BLD-SCNC: 1.33 MMOL/L (ref 0.5–2)
LEUKOCYTE ESTERASE UR QL STRIP.AUTO: ABNORMAL
LYMPHOCYTES # BLD AUTO: 2.36 K/UL (ref 1–4.8)
LYMPHOCYTES NFR BLD: 16.1 % (ref 22–41)
MCH RBC QN AUTO: 32.1 PG (ref 27–33)
MCHC RBC AUTO-ENTMCNC: 36 G/DL (ref 33.7–35.3)
MCV RBC AUTO: 89.3 FL (ref 81.4–97.8)
MICRO URNS: ABNORMAL
MONOCYTES # BLD AUTO: 1.35 K/UL (ref 0–0.85)
MONOCYTES NFR BLD AUTO: 9.2 % (ref 0–13.4)
MUCOUS THREADS #/AREA URNS HPF: ABNORMAL /HPF
NEUTROPHILS # BLD AUTO: 10.65 K/UL (ref 1.82–7.42)
NEUTROPHILS NFR BLD: 72.5 % (ref 44–72)
NITRITE UR QL STRIP.AUTO: NEGATIVE
NRBC # BLD AUTO: 0 K/UL
NRBC BLD AUTO-RTO: 0 /100 WBC
PH UR STRIP.AUTO: 5.5 [PH]
PLATELET # BLD AUTO: 206 K/UL (ref 164–446)
PMV BLD AUTO: 10.4 FL (ref 9–12.9)
POTASSIUM SERPL-SCNC: 4 MMOL/L (ref 3.6–5.5)
PROT SERPL-MCNC: 7.5 G/DL (ref 6–8.2)
PROT UR QL STRIP: 30 MG/DL
RBC # BLD AUTO: 5.32 M/UL (ref 4.7–6.1)
RBC # URNS HPF: ABNORMAL /HPF
RBC UR QL AUTO: ABNORMAL
SODIUM SERPL-SCNC: 138 MMOL/L (ref 135–145)
SP GR UR STRIP.AUTO: 1.02
SPECIMEN DRAWN FROM PATIENT: NORMAL
TROPONIN I SERPL-MCNC: <0.02 NG/ML (ref 0–0.04)
WBC # BLD AUTO: 14.7 K/UL (ref 4.8–10.8)
WBC #/AREA URNS HPF: ABNORMAL /HPF

## 2017-10-28 PROCEDURE — 84484 ASSAY OF TROPONIN QUANT: CPT

## 2017-10-28 PROCEDURE — 74176 CT ABD & PELVIS W/O CONTRAST: CPT

## 2017-10-28 PROCEDURE — 85025 COMPLETE CBC W/AUTO DIFF WBC: CPT

## 2017-10-28 PROCEDURE — 36415 COLL VENOUS BLD VENIPUNCTURE: CPT

## 2017-10-28 PROCEDURE — 83605 ASSAY OF LACTIC ACID: CPT

## 2017-10-28 PROCEDURE — 94760 N-INVAS EAR/PLS OXIMETRY 1: CPT

## 2017-10-28 PROCEDURE — 87086 URINE CULTURE/COLONY COUNT: CPT

## 2017-10-28 PROCEDURE — 81001 URINALYSIS AUTO W/SCOPE: CPT

## 2017-10-28 PROCEDURE — 99284 EMERGENCY DEPT VISIT MOD MDM: CPT

## 2017-10-28 PROCEDURE — 700111 HCHG RX REV CODE 636 W/ 250 OVERRIDE (IP): Performed by: EMERGENCY MEDICINE

## 2017-10-28 PROCEDURE — 96374 THER/PROPH/DIAG INJ IV PUSH: CPT

## 2017-10-28 PROCEDURE — 700105 HCHG RX REV CODE 258: Performed by: EMERGENCY MEDICINE

## 2017-10-28 PROCEDURE — 93005 ELECTROCARDIOGRAM TRACING: CPT | Performed by: EMERGENCY MEDICINE

## 2017-10-28 PROCEDURE — 96361 HYDRATE IV INFUSION ADD-ON: CPT

## 2017-10-28 PROCEDURE — 80053 COMPREHEN METABOLIC PANEL: CPT

## 2017-10-28 PROCEDURE — 83880 ASSAY OF NATRIURETIC PEPTIDE: CPT

## 2017-10-28 RX ORDER — ONDANSETRON 4 MG/1
8 TABLET, ORALLY DISINTEGRATING ORAL ONCE
Status: COMPLETED | OUTPATIENT
Start: 2017-10-28 | End: 2017-10-28

## 2017-10-28 RX ORDER — MORPHINE SULFATE 10 MG/ML
8 INJECTION, SOLUTION INTRAMUSCULAR; INTRAVENOUS ONCE
Status: COMPLETED | OUTPATIENT
Start: 2017-10-28 | End: 2017-10-28

## 2017-10-28 RX ORDER — SODIUM CHLORIDE 9 MG/ML
1000 INJECTION, SOLUTION INTRAVENOUS ONCE
Status: COMPLETED | OUTPATIENT
Start: 2017-10-28 | End: 2017-10-29

## 2017-10-28 RX ADMIN — MORPHINE SULFATE 8 MG: 10 INJECTION INTRAVENOUS at 22:40

## 2017-10-28 RX ADMIN — ONDANSETRON 8 MG: 4 TABLET, ORALLY DISINTEGRATING ORAL at 22:40

## 2017-10-28 RX ADMIN — SODIUM CHLORIDE 1000 ML: 9 INJECTION, SOLUTION INTRAVENOUS at 22:40

## 2017-10-28 ASSESSMENT — PAIN SCALES - GENERAL: PAINLEVEL_OUTOF10: 7

## 2017-10-29 VITALS
TEMPERATURE: 98 F | SYSTOLIC BLOOD PRESSURE: 150 MMHG | HEIGHT: 69 IN | WEIGHT: 257.94 LBS | BODY MASS INDEX: 38.2 KG/M2 | OXYGEN SATURATION: 94 % | DIASTOLIC BLOOD PRESSURE: 88 MMHG | RESPIRATION RATE: 16 BRPM | HEART RATE: 66 BPM

## 2017-10-29 LAB — EKG IMPRESSION: NORMAL

## 2017-10-29 PROCEDURE — A9270 NON-COVERED ITEM OR SERVICE: HCPCS | Performed by: EMERGENCY MEDICINE

## 2017-10-29 PROCEDURE — 700102 HCHG RX REV CODE 250 W/ 637 OVERRIDE(OP): Performed by: EMERGENCY MEDICINE

## 2017-10-29 RX ORDER — CEPHALEXIN 500 MG/1
1000 CAPSULE ORAL 3 TIMES DAILY
Qty: 60 CAP | Refills: 0 | Status: SHIPPED | OUTPATIENT
Start: 2017-10-29 | End: 2017-11-08

## 2017-10-29 RX ORDER — CEPHALEXIN 250 MG/1
1000 CAPSULE ORAL ONCE
Status: COMPLETED | OUTPATIENT
Start: 2017-10-29 | End: 2017-10-29

## 2017-10-29 RX ORDER — CEFDINIR 300 MG/1
300 CAPSULE ORAL EVERY 12 HOURS
Status: DISCONTINUED | OUTPATIENT
Start: 2017-10-29 | End: 2017-10-29

## 2017-10-29 RX ADMIN — CEPHALEXIN 1000 MG: 250 CAPSULE ORAL at 00:30

## 2017-10-29 NOTE — DISCHARGE INSTRUCTIONS
You were seen and evaluated in the Emergency Department at [facility] for:     Back pain and urine symptoms.     You had the following tests and studies:    Ct scan, blood and urine tests. Your stone passed into your bladder! You do have evidence of a mild urine infection.     You received the following medications:    IV fluids, morphine, and ondansetron.    You received the following prescriptions:    Cephalexin, an antibiotic to take for the next 10 days.   ----------------------------    Please make sure to follow up with:    Your primary care doctor in 2 days to make sure you're healing well.    Good luck, and feel better!  ----------------------------    We always encourage patients to return IMMEDIATELY if they have:  Increased or changing pain, passing out, fevers over 100.4 (taken in your mouth or rectally) for more than 2 days, redness or swelling of skin or tissues, feeling like your heart is beating fast, chest pain that is new or worsening, trouble breathing, feeling like your throat is closing up and can not breath, inability to walk, weakness of any part of your body, new dizziness, severe bleeding that won't stop from any part of your body, if you can't eat or drink, or if you have any other concerns.   If you feel worse, please know that you can always return with any questions, concerns, worse symptoms, or you are feeling unsafe. We certainly cannot say for sure that we have ruled out every illness or dangerous disease, but we feel that at this specific time, your exam, tests, and vital signs like heart rate and blood pressure are safe for discharge.           Kidney Stones  Kidney stones (urolithiasis) are deposits that form inside your kidneys. The intense pain is caused by the stone moving through the urinary tract. When the stone moves, the ureter goes into spasm around the stone. The stone is usually passed in the urine.   CAUSES   · A disorder that makes certain neck glands produce  too much parathyroid hormone (primary hyperparathyroidism).  · A buildup of uric acid crystals, similar to gout in your joints.  · Narrowing (stricture) of the ureter.  · A kidney obstruction present at birth (congenital obstruction).  · Previous surgery on the kidney or ureters.  · Numerous kidney infections.  SYMPTOMS   · Feeling sick to your stomach (nauseous).  · Throwing up (vomiting).  · Blood in the urine (hematuria).  · Pain that usually spreads (radiates) to the groin.  · Frequency or urgency of urination.  DIAGNOSIS   · Taking a history and physical exam.  · Blood or urine tests.  · CT scan.  · Occasionally, an examination of the inside of the urinary bladder (cystoscopy) is performed.  TREATMENT   · Observation.  · Increasing your fluid intake.  · Extracorporeal shock wave lithotripsy--This is a noninvasive procedure that uses shock waves to break up kidney stones.  · Surgery may be needed if you have severe pain or persistent obstruction. There are various surgical procedures. Most of the procedures are performed with the use of small instruments. Only small incisions are needed to accommodate these instruments, so recovery time is minimized.  The size, location, and chemical composition are all important variables that will determine the proper choice of action for you. Talk to your health care provider to better understand your situation so that you will minimize the risk of injury to yourself and your kidney.   HOME CARE INSTRUCTIONS   · Drink enough water and fluids to keep your urine clear or pale yellow. This will help you to pass the stone or stone fragments.  · Strain all urine through the provided strainer. Keep all particulate matter and stones for your health care provider to see. The stone causing the pain may be as small as a grain of salt. It is very important to use the strainer each and every time you pass your urine. The collection of your stone will allow your health care provider to  analyze it and verify that a stone has actually passed. The stone analysis will often identify what you can do to reduce the incidence of recurrences.  · Only take over-the-counter or prescription medicines for pain, discomfort, or fever as directed by your health care provider.  · Make a follow-up appointment with your health care provider as directed.  · Get follow-up X-rays if required. The absence of pain does not always mean that the stone has passed. It may have only stopped moving. If the urine remains completely obstructed, it can cause loss of kidney function or even complete destruction of the kidney. It is your responsibility to make sure X-rays and follow-ups are completed. Ultrasounds of the kidney can show blockages and the status of the kidney. Ultrasounds are not associated with any radiation and can be performed easily in a matter of minutes.  SEEK MEDICAL CARE IF:  · You experience pain that is progressive and unresponsive to any pain medicine you have been prescribed.  SEEK IMMEDIATE MEDICAL CARE IF:   · Pain cannot be controlled with the prescribed medicine.  · You have a fever or shaking chills.  · The severity or intensity of pain increases over 18 hours and is not relieved by pain medicine.  · You develop a new onset of abdominal pain.  · You feel faint or pass out.  · You are unable to urinate.  MAKE SURE YOU:   · Understand these instructions.  · Will watch your condition.  · Will get help right away if you are not doing well or get worse.     This information is not intended to replace advice given to you by your health care provider. Make sure you discuss any questions you have with your health care provider.     Document Released: 12/18/2006 Document Revised: 01/08/2016 Document Reviewed: 05/21/2014  [employer] Interactive Patient Education ©2016 [employer]      Pyelonephritis, Adult  Pyelonephritis is a kidney infection. A kidney infection can happen quickly, or it can last for a long  time.  HOME CARE   · Take your medicine (antibiotics) as told. Finish it even if you start to feel better.  · Keep all doctor visits as told.  · Drink enough fluids to keep your pee (urine) clear or pale yellow.  · Only take medicine as told by your doctor.  GET HELP RIGHT AWAY IF:   · You have a fever or lasting symptoms for more than 2-3 days.  · You have a fever and your symptoms suddenly get worse.  · You cannot take your medicine or drink fluids as told.  · You have chills and shaking.  · You feel very weak or pass out (faint).  · You do not feel better after 2 days.  MAKE SURE YOU:  · Understand these instructions.  · Will watch your condition.  · Will get help right away if you are not doing well or get worse.     This information is not intended to replace advice given to you by your health care provider. Make sure you discuss any questions you have with your health care provider.     Document Released: 01/25/2006 Document Revised: 01/08/2016 Document Reviewed: 06/06/2012  "[employer] Interactive Patient Education ©2016 "[employer]

## 2017-10-29 NOTE — ED NOTES
Discharge information provided. Pt verbalized understanding of discharge instructions to follow up with PCP and to return to ER if condition worsens. Pt expressed the awareness of not driving or operating heavy machinery, has ride home with Wife. Pt ambulated out of ER in a steady gait, no additional questions or concerns. Paper scripts given.

## 2017-10-29 NOTE — ED NOTES
Pt presents accompanied by spouse.  He was seen in our department this past Thursday and discharged with a diagnosis of nephrolithiasis.  He returns with unresolved symptomatology.

## 2017-10-29 NOTE — ED NOTES
"Pt presents with c/o back and groin pain. Pt was diagnosed with kidney stones last Thursday. Pt was unable to afford the flomax that was prescribed to him. Pt states that the norco only helps for 15 minutes and then the pain is back. Pain is currently \"1000\"/10  "

## 2017-10-29 NOTE — ED PROVIDER NOTES
ED Provider Note    CHIEF COMPLAINT  Chief Complaint   Patient presents with   • Low Back Pain     \[facility]\""    Primary care provider: Jesus Thomas M.D.  Means of arrival: POV  History obtained from: patient and wife  History limited by: nothing    Tomy Isaac is a 52 y.o. male who presents with flank pain.    Location: L flank, back radiating to LLQ and groin    Quality: sharp, achy    Severity: at worst severe, at present moderate    Duration: onset 4dago    Timing: intermittent, waxing/waning severity    Context: dx'd with kidney stone 2d ago, now with continuing pain    Modifying factors: minimal relief with norco at home, denies aggravating factors    Associated signs/symptoms: denies fevers, vomiting, rashes, trauma, hematuria, chest pain, sob, swelling.    REVIEW OF SYSTEMS  Constitutional: Negative for fever or chills.   HENT: Negative for nosebleeds or sore throat.    Eyes: Negative for vision changes or discharge.   Respiratory: Negative for cough or shortness of breath.    Cardiovascular: Negative for chest pain or palpitations.   Gastrointestinal: Negative for nausea, vomiting, abdominal pain.   Genitourinary: Negative for dysuria but positive for flank pain.   Musculoskeletal: Positive for back pain but no joint pain.   Skin: Negative for itching or rash.   Neurological: Negative for sensory or motor changes.   Endo/Heme/Allergies: Negative for weight changes or hives.   Psychiatric/Behavioral: Negative for depression or suicidal ideas.   See HPI for further details.     PAST MEDICAL HISTORY  Past Medical History:   Diagnosis Date   • Hernia, umbilical    • Hypertension        PAST FAMILY HISTORY  Family History   Problem Relation Age of Onset   • Heart Disease Father    • Stroke Father    • Diabetes Father        SOCIAL HISTORY  Social History     Social History Main Topics   • Smoking status: Current Every Day Smoker     Packs/day: 1.00     Years: 40.00     Types: Cigarettes   • Smokeless tobacco:  "Never Used   • Alcohol use Yes      Comment: Occasionally   • Drug use: No   • Sexual activity: Not Currently       SURGICAL HISTORY  patient denies any surgical history    CURRENT MEDICATIONS  Home Medications     Reviewed by Dominic Vance R.N. (Registered Nurse) on 10/28/17 at 2104  Med List Status: Complete   Medication Last Dose Status   hydrocodone-acetaminophen (NORCO) 5-325 MG Tab per tablet 10/28/2017 Active   ibuprofen (MOTRIN) 600 MG Tab 10/28/2017 Active   lisinopril (PRINIVIL) 10 MG Tab 10/28/2017 Active   metformin (GLUCOPHAGE) 500 MG Tab 10/26/2017 Active   metoprolol (TOPROL-XL) 200 MG XL tablet  Active   ondansetron (ZOFRAN ODT) 8 MG TABLET DISPERSIBLE 10/28/2017 Active   tamsulosin (FLOMAX) 0.4 MG capsule Not taking Active              ALLERGIES  No Known Allergies    PHYSICAL EXAM  VITAL SIGNS: /88   Pulse 66   Temp 36.7 °C (98 °F)   Resp 16   Ht 1.753 m (5' 9\") Comment: Stated  Wt 117 kg (257 lb 15 oz)   SpO2 94%   BMI 38.09 kg/m²    Pulse ox interpretation: On room air, I interpret this pulse ox as normal.  Constitutional: Well developed, well nourished. Uncomfortable appearing in no distress.  HEENT: Normocephalic, atraumatic. Posterior pharynx clear, mucous membranes dry.  Eyes:  EOMI. Normal sclera.  Neck: Supple, Full range of motion, nontender.  Chest/Pulmonary: Clear to ausculation bilaterally, no wheezes or rhonchi.  Cardio: Regular rate and rhythm, no murmur.   Abdomen: Soft, nontender, no rebound, guarding, or masses.  Back: L CVA tenderness is present, nontender midline, no step offs.  Musculoskeletal: No deformity, no edema, neurovascular intact.   Neuro: Clear speech, appropriate, cooperative, cranial nerves II-XII grossly intact.  Psych: Normal mood and affect.  Skin: No rashes, warm and dry.      DIAGNOSTIC STUDIES / PROCEDURES    LABS & EKG  Results for orders placed or performed during the hospital encounter of 10/28/17   CBC WITH DIFFERENTIAL   Result Value Ref " Range    WBC 14.7 (H) 4.8 - 10.8 K/uL    RBC 5.32 4.70 - 6.10 M/uL    Hemoglobin 17.1 14.0 - 18.0 g/dL    Hematocrit 47.5 42.0 - 52.0 %    MCV 89.3 81.4 - 97.8 fL    MCH 32.1 27.0 - 33.0 pg    MCHC 36.0 (H) 33.7 - 35.3 g/dL    RDW 39.5 35.9 - 50.0 fL    Platelet Count 206 164 - 446 K/uL    MPV 10.4 9.0 - 12.9 fL    Neutrophils-Polys 72.50 (H) 44.00 - 72.00 %    Lymphocytes 16.10 (L) 22.00 - 41.00 %    Monocytes 9.20 0.00 - 13.40 %    Eosinophils 1.40 0.00 - 6.90 %    Basophils 0.50 0.00 - 1.80 %    Immature Granulocytes 0.30 0.00 - 0.90 %    Nucleated RBC 0.00 /100 WBC    Neutrophils (Absolute) 10.65 (H) 1.82 - 7.42 K/uL    Lymphs (Absolute) 2.36 1.00 - 4.80 K/uL    Monos (Absolute) 1.35 (H) 0.00 - 0.85 K/uL    Eos (Absolute) 0.21 0.00 - 0.51 K/uL    Baso (Absolute) 0.08 0.00 - 0.12 K/uL    Immature Granulocytes (abs) 0.05 0.00 - 0.11 K/uL    NRBC (Absolute) 0.00 K/uL   COMP METABOLIC PANEL   Result Value Ref Range    Sodium 138 135 - 145 mmol/L    Potassium 4.0 3.6 - 5.5 mmol/L    Chloride 104 96 - 112 mmol/L    Co2 24 20 - 33 mmol/L    Anion Gap 10.0 0.0 - 11.9    Glucose 146 (H) 65 - 99 mg/dL    Bun 24 (H) 8 - 22 mg/dL    Creatinine 1.42 (H) 0.50 - 1.40 mg/dL    Calcium 9.2 8.4 - 10.2 mg/dL    AST(SGOT) 16 12 - 45 U/L    ALT(SGPT) 14 2 - 50 U/L    Alkaline Phosphatase 78 30 - 99 U/L    Total Bilirubin 0.8 0.1 - 1.5 mg/dL    Albumin 4.2 3.2 - 4.9 g/dL    Total Protein 7.5 6.0 - 8.2 g/dL    Globulin 3.3 1.9 - 3.5 g/dL    A-G Ratio 1.3 g/dL   TROPONIN   Result Value Ref Range    Troponin I <0.02 0.00 - 0.04 ng/mL   BTYPE NATRIURETIC PEPTIDE   Result Value Ref Range    B Natriuretic Peptide 162 (H) 0 - 100 pg/mL   URINALYSIS CULTURE, IF INDICATED   Result Value Ref Range    Color Yellow     Character Hazy (A)     Specific Gravity 1.020 <1.035    Ph 5.5 5.0 - 8.0    Glucose Negative Negative mg/dL    Ketones Negative Negative mg/dL    Protein 30 (A) Negative mg/dL    Bilirubin Negative Negative    Nitrite Negative  Negative    Leukocyte Esterase Trace (A) Negative    Occult Blood Large (A) Negative    Micro Urine Req Microscopic     Culture Indicated Yes UA Culture   LACTIC ACID   Result Value Ref Range    Lactic Acid 1.33 0.50 - 2.00 mmol/L    Specimen Venous    URINE MICROSCOPIC (W/UA)   Result Value Ref Range    WBC 2-5 (A) /hpf    -150 (A) /hpf    Bacteria Rare (A) None /hpf    Epithelial Cells Rare Few /hpf    Mucous Threads Few /hpf   ESTIMATED GFR   Result Value Ref Range    GFR If African American >60 >60 mL/min/1.73 m 2    GFR If Non African American 52 (A) >60 mL/min/1.73 m 2   EKG (NOW)   Result Value Ref Range    Report       [facility] Emergency Dept.    Test Date:  2017-10-29  Pt Name:    LIA CHOWDARY                 Department: [facility]  MRN:        [id number]                      [facility]  Gender:     M                            Technician: [id number]  :        1965                   Requested By:MISAEL KUMAR  Order #:    [id number]                    Reading MD: Misael Kumar MD    Measurements  Intervals                                Axis  Rate:       60                           P:          55  UT:         160                          QRS:        76  QRSD:       104                          T:          40  QT:         424  QTc:        424    Interpretive Statements  SINUS RHYTHM  no stemi or strain or dysrhythmia  Compared to ECG 10/26/2017 21:29:40  No significant changes    Electronically Signed On 10- 16:13:32 PDT by Misael Kumar MD       RADIOLOGY  CT-RENAL COLIC EVALUATION(A/P W/O)   Final Result      1.  Interval passage of left ureteral calculus which is now dependent within the bladder and measures approximately 2-3 mm      2.  Mild residual left renal edema      3.  Ventral hernia adjacent to the umbilicus containing omental fat but no bowel      4.  Colonic diverticulosis        COURSE & MEDICAL DECISION MAKING    This is a 52 y.o. male  who presents with persistent fluctuating flank pain, recent dx of kidney stone.    Differential Diagnosis includes but is not limited to:  Nephrolithiasis, pyelonephritis, obstruction, AAA, ACS mimic    ED Course:  Plan labs, imaging, fluids for presumed dehydration, parenteral meds for symptom control, reevaluation.  EKG NSR no stemi or strain or dysrhythmia, trop neg doubt ACS.   No swelling or hypoxia or chest pain doubt PE.  Labs with wbc elevation, Hyperglycemic w/o acidosis.   CT w/ stone passage, renal edema.  UA with slight infectious markers, since still symptomatic plan to treat for possible early pyelo with cefdinir and dc home. Symptoms greatly improved, has outpatient opiates for pain control and nsaids, and will return if any worse or no better. No rash doubt zoster. Keflex TID 1g Rx provided.     Medications   NS infusion 1,000 mL (0 mL Intravenous Stopped 10/29/17 0033)   morphine (pf) 10 mg/ml injection 8 mg (8 mg Intravenous Given 10/28/17 2240)   ondansetron (ZOFRAN ODT) dispertab 8 mg (8 mg Oral Given 10/28/17 2240)   cephALEXin (KEFLEX) capsule 1,000 mg (1,000 mg Oral Given 10/29/17 0030)     FINAL IMPRESSION  1. Kidney stone    2. Pyelonephritis    3. Essential hypertension    4. Type 2 diabetes mellitus with complication, without long-term current use of insulin (CMS-[facility])    5. Obesity, unspecified classification, unspecified obesity type, unspecified whether serious comorbidity present    6. Tobacco abuse      PRESCRIPTIONS  Discharge Medication List as of 10/29/2017 12:34 AM      START taking these medications    Details   cephALEXin (KEFLEX) 500 MG Cap Take 2 Caps by mouth 3 times a day for 10 days., Disp-60 Cap, R-0, Print Rx Paper           FOLLOW UP  Jesus Thomas M.D.  [address]  [address]  [place] [address] [phone]    Schedule an appointment as soon as possible for a visit in 2 days      [facility], Emergency Dept  [address]  [facility]  [address]  [phone]  In 1 day  If symptoms worsen    -DISCHARGE-    Results, exam findings, clinical impression, presumed diagnosis, treatment options, and strict return precautions were discussed with the patient and family, and they verbalized understanding, agreed with, and appreciated the plan of care.    Pertinent Labs & Imaging studies reviewed and verified by myself, as well as nursing notes and the patient's past medical, family, and social histories (See chart for details).    The patient is referred to his primary physician for blood pressure management, diabetic management, and for all other preventative health concerns.     Electronically signed by Misael Boone on 10/29/2017 at 4:10 PM.

## 2017-10-31 ENCOUNTER — PATIENT OUTREACH (OUTPATIENT)
Dept: HEALTH INFORMATION MANAGEMENT | Facility: OTHER | Age: 52
End: 2017-10-31

## 2017-10-31 LAB
BACTERIA UR CULT: NORMAL
SIGNIFICANT IND 70042: NORMAL
SITE SITE: NORMAL
SOURCE SOURCE: NORMAL

## 2020-05-26 ENCOUNTER — HOSPITAL ENCOUNTER (EMERGENCY)
Facility: MEDICAL CENTER | Age: 55
End: 2020-05-26
Attending: EMERGENCY MEDICINE
Payer: COMMERCIAL

## 2020-05-26 ENCOUNTER — APPOINTMENT (OUTPATIENT)
Dept: RADIOLOGY | Facility: MEDICAL CENTER | Age: 55
End: 2020-05-26
Attending: EMERGENCY MEDICINE
Payer: COMMERCIAL

## 2020-05-26 VITALS
HEIGHT: 69 IN | TEMPERATURE: 99 F | HEART RATE: 73 BPM | SYSTOLIC BLOOD PRESSURE: 141 MMHG | WEIGHT: 246.91 LBS | BODY MASS INDEX: 36.57 KG/M2 | RESPIRATION RATE: 21 BRPM | DIASTOLIC BLOOD PRESSURE: 87 MMHG | OXYGEN SATURATION: 90 %

## 2020-05-26 DIAGNOSIS — R05.9 COUGH: ICD-10-CM

## 2020-05-26 DIAGNOSIS — Z71.89 ADVICE GIVEN ABOUT 2019-NCOV INFECTION: Primary | ICD-10-CM

## 2020-05-26 LAB
ALBUMIN SERPL BCP-MCNC: 4.1 G/DL (ref 3.2–4.9)
ALBUMIN/GLOB SERPL: 1.2 G/DL
ALP SERPL-CCNC: 94 U/L (ref 30–99)
ALT SERPL-CCNC: 12 U/L (ref 2–50)
ANION GAP SERPL CALC-SCNC: 14 MMOL/L (ref 7–16)
AST SERPL-CCNC: 12 U/L (ref 12–45)
BASOPHILS # BLD AUTO: 0.5 % (ref 0–1.8)
BASOPHILS # BLD: 0.08 K/UL (ref 0–0.12)
BILIRUB SERPL-MCNC: 0.6 MG/DL (ref 0.1–1.5)
BUN SERPL-MCNC: 20 MG/DL (ref 8–22)
CALCIUM SERPL-MCNC: 9.3 MG/DL (ref 8.4–10.2)
CHLORIDE SERPL-SCNC: 100 MMOL/L (ref 96–112)
CO2 SERPL-SCNC: 25 MMOL/L (ref 20–33)
COVID ORDER STATUS COVID19: NORMAL
CREAT SERPL-MCNC: 1.09 MG/DL (ref 0.5–1.4)
EKG IMPRESSION: NORMAL
EOSINOPHIL # BLD AUTO: 0.22 K/UL (ref 0–0.51)
EOSINOPHIL NFR BLD: 1.5 % (ref 0–6.9)
ERYTHROCYTE [DISTWIDTH] IN BLOOD BY AUTOMATED COUNT: 37.8 FL (ref 35.9–50)
GLOBULIN SER CALC-MCNC: 3.3 G/DL (ref 1.9–3.5)
GLUCOSE SERPL-MCNC: 296 MG/DL (ref 65–99)
HCT VFR BLD AUTO: 49.3 % (ref 42–52)
HGB BLD-MCNC: 17.1 G/DL (ref 14–18)
IMM GRANULOCYTES # BLD AUTO: 0.07 K/UL (ref 0–0.11)
IMM GRANULOCYTES NFR BLD AUTO: 0.5 % (ref 0–0.9)
LYMPHOCYTES # BLD AUTO: 2.85 K/UL (ref 1–4.8)
LYMPHOCYTES NFR BLD: 19.2 % (ref 22–41)
MCH RBC QN AUTO: 30.9 PG (ref 27–33)
MCHC RBC AUTO-ENTMCNC: 34.7 G/DL (ref 33.7–35.3)
MCV RBC AUTO: 89 FL (ref 81.4–97.8)
MONOCYTES # BLD AUTO: 1.05 K/UL (ref 0–0.85)
MONOCYTES NFR BLD AUTO: 7.1 % (ref 0–13.4)
NEUTROPHILS # BLD AUTO: 10.58 K/UL (ref 1.82–7.42)
NEUTROPHILS NFR BLD: 71.2 % (ref 44–72)
NRBC # BLD AUTO: 0 K/UL
NRBC BLD-RTO: 0 /100 WBC
PLATELET # BLD AUTO: 291 K/UL (ref 164–446)
PMV BLD AUTO: 9.8 FL (ref 9–12.9)
POTASSIUM SERPL-SCNC: 4.2 MMOL/L (ref 3.6–5.5)
PROT SERPL-MCNC: 7.4 G/DL (ref 6–8.2)
RBC # BLD AUTO: 5.54 M/UL (ref 4.7–6.1)
SODIUM SERPL-SCNC: 139 MMOL/L (ref 135–145)
WBC # BLD AUTO: 14.9 K/UL (ref 4.8–10.8)

## 2020-05-26 PROCEDURE — 85025 COMPLETE CBC W/AUTO DIFF WBC: CPT

## 2020-05-26 PROCEDURE — 99283 EMERGENCY DEPT VISIT LOW MDM: CPT

## 2020-05-26 PROCEDURE — 93005 ELECTROCARDIOGRAM TRACING: CPT | Performed by: EMERGENCY MEDICINE

## 2020-05-26 PROCEDURE — C9803 HOPD COVID-19 SPEC COLLECT: HCPCS | Performed by: EMERGENCY MEDICINE

## 2020-05-26 PROCEDURE — 80053 COMPREHEN METABOLIC PANEL: CPT

## 2020-05-26 PROCEDURE — 71045 X-RAY EXAM CHEST 1 VIEW: CPT

## 2020-05-26 PROCEDURE — 36415 COLL VENOUS BLD VENIPUNCTURE: CPT

## 2020-05-26 RX ORDER — LINAGLIPTIN 5 MG/1
5 TABLET, FILM COATED ORAL DAILY
COMMUNITY

## 2020-05-26 RX ORDER — ATORVASTATIN CALCIUM 20 MG/1
20 TABLET, FILM COATED ORAL NIGHTLY
COMMUNITY

## 2020-05-26 ASSESSMENT — ENCOUNTER SYMPTOMS
HEADACHES: 0
PALPITATIONS: 0
FEVER: 0
BACK PAIN: 0
ABDOMINAL PAIN: 0
COUGH: 1
ORTHOPNEA: 0
SORE THROAT: 0
CHILLS: 0
DIZZINESS: 0
NAUSEA: 0
NECK PAIN: 0
SHORTNESS OF BREATH: 1
VOMITING: 0
WHEEZING: 0
CLAUDICATION: 0

## 2020-05-26 ASSESSMENT — LIFESTYLE VARIABLES: SUBSTANCE_ABUSE: 0

## 2020-05-27 NOTE — ED TRIAGE NOTES
"Chief Complaint   Patient presents with   • Shortness of Breath     pt c/o SOB x 1 week intermittantly    • Cough     pt c/o cough x 1 week     /98   Pulse 79   Temp 37.2 °C (99 °F) (Oral)   Resp 18   Ht 1.753 m (5' 9\")   Wt 112 kg (246 lb 14.6 oz)   SpO2 92%   BMI 36.46 kg/m²     Pt ambulates to triage with stable gait, alert and oriented, regular unlabored respirations. Face mask provided to pt. Charge RN aware of pt c/o of SOB and pt need for isolation precautions. Pt room from triage.  "

## 2020-05-27 NOTE — ED PROVIDER NOTES
ED Provider Note     5/26/2020  8:03 PM    Means of Arrival: Walk In  History obtained by: patient  Limitations: none    CHIEF COMPLAINT  Chief Complaint   Patient presents with   • Shortness of Breath     pt c/o SOB x 1 week intermittantly    • Cough     pt c/o cough x 1 week       HPI  Tomy Isaac is a 55 y.o. male 40-year pack year smoking history, hyperlipidemia, diabetes who presents with concerns of shortness of breath and cough starting proximally 1 week ago.  He is a .  He describes the cough is dry with minimal production.  Denies recording any fevers.  Has had no GI symptoms, no loss of taste or smell.  He has no chest pain.  He has noticed mild shortness of breath associated with cough.  No known sick contacts.    REVIEW OF SYSTEMS  Review of Systems   Constitutional: Negative for chills and fever.   HENT: Negative for congestion and sore throat.    Respiratory: Positive for cough and shortness of breath. Negative for wheezing.    Cardiovascular: Negative for chest pain, palpitations, orthopnea and claudication.   Gastrointestinal: Negative for abdominal pain, nausea and vomiting.   Musculoskeletal: Negative for back pain and neck pain.   Neurological: Negative for dizziness and headaches.   Psychiatric/Behavioral: Negative for substance abuse.   All other systems reviewed and are negative.    See HPI for further details.    PAST MEDICAL HISTORY   has a past medical history of Hernia, umbilical and Hypertension.    SOCIAL HISTORY  Social History     Tobacco Use   • Smoking status: Current Every Day Smoker     Packs/day: 1.00     Years: 40.00     Pack years: 40.00     Types: Cigarettes   • Smokeless tobacco: Never Used   Substance and Sexual Activity   • Alcohol use: Not Currently     Comment: Occasionally   • Drug use: No   • Sexual activity: Not Currently       SURGICAL HISTORY  patient denies any surgical history    CURRENT MEDICATIONS  Home Medications     Reviewed by Katie BLANTON  "NIK Augustin (Registered Nurse) on 05/26/20 at 1934  Med List Status: Partial   Medication Last Dose Status   aspirin EC (ECOTRIN) 81 MG Tablet Delayed Response 5/25/2020 Active   atorvastatin (LIPITOR) 20 MG Tab 5/25/2020 Active   linagliptin (TRADJENTA) 5 MG Tab tablet 5/19/2020 Active   lisinopril (PRINIVIL) 10 MG Tab 5/25/2020 Active   metformin (GLUCOPHAGE) 500 MG Tab 5/25/2020 Active   metoprolol (TOPROL-XL) 200 MG XL tablet 5/25/2020 Active                ALLERGIES  No Known Allergies    PHYSICAL EXAM  VITAL SIGNS: /75   Pulse 72   Temp 37.2 °C (99 °F) (Oral)   Resp (!) 21   Ht 1.753 m (5' 9\")   Wt 112 kg (246 lb 14.6 oz)   SpO2 92%   BMI 36.46 kg/m²     Pulse ox interpretation: I interpret this pulse ox as normal.  Constitutional: Alert in no apparent distress.  HENT: No signs of trauma, Bilateral external ears normal, Nose normal.   Eyes: Pupils are equal, Conjunctiva normal, Non-icteric.   Neck: Normal range of motion, No tenderness, Supple, No stridor.  No JVD.  Cardiovascular: Regular rate and rhythm, no murmurs. Symmetric distal pulses. No cyanosis of extremities. No peripheral edema of extremities.  Thorax & Lungs: No increased respiratory effort, faint crackles at right lungs, no wheezes. Intermittent dry cough.  Abdomen:  Soft, No tenderness, No masses, No pulsatile masses. No peritoneal signs.  Skin: Warm, Dry, No erythema, No rash.   Back: No midline bony tenderness, No CVA tenderness.   Musculoskeletal: Good range of motion in all major joints. No tenderness to palpation or major deformities noted.   Neurologic: Alert , Normal motor function, Normal sensory function, No focal deficits noted.   Psychiatric: Affect normal, Judgment normal, Mood normal.   Physical Exam      DIAGNOSTIC STUDIES / PROCEDURES    EKG  Results for orders placed or performed during the hospital encounter of 05/26/20   EKG   Result Value Ref Range    Report       [facility] Emergency " Dept.    Test Date:  2020  Pt Name:    LIA CHOWDARY                 Department: [facility]  MRN:        [id number]                      Room:       -ROOM 5  Gender:     Male                         Technician: SAMM  :        1965                   Requested By:ER TRIAGE PROTOCOL  Order #:    [id number]                    Reading MD: Seferino Best II, MD    Measurements  Intervals                                Axis  Rate:       77                           P:          45  NH:         152                          QRS:        72  QRSD:       100                          T:          6  QT:         400  QTc:        453    Interpretive Statements  SINUS RHYTHM  Rate 77  Normal intervals  No ST elevation or depression.  Normal sinus rhythm EKG  Compared to ECG 10/29/2017 00:23:50  T-wave abnormality now present  Electronically Signed On 2020 22:17:59 PDT by Seferino Best II, MD           LABS  Pertinent Labs & Imaging studies reviewed. (See chart for details)    RADIOLOGY  Pertinent Labs & Imaging studies reviewed. (See chart for details)    COURSE & MEDICAL DECISION MAKING  Pertinent Labs & Imaging studies reviewed. (See chart for details)    8:03 PM This is an emergent evaluation of a  55 y.o. male who presents with persistent cough for the past week.  No fevers.  Minimal to no dyspnea.  No exertional symptoms.  No chest pain.  No leg swelling.  He works as a  in the community.  No known sick contacts.  Given diagnosis includes viral infection specifically COVID-19, bronchitis, bacterial pneumonia.  Lower suspicion for CHF or PE.  His EKG is within acceptable limits.    10:23 PM  Mildly increased white blood cell count of 14.  Chest x-ray without any obvious consolidation or opacities.  He continues to have a saturation in the 90s, no increased respiratory effort.  Reviewed results with him and he is agreeable with discharge.  A send out COVID-19 test was done and he was told he should  receive results within 48-72 hours.  I have asked him to stay home from work until his symptoms have resolved, instructions on when to return to work are provided in his discharge paperwork.       The patient will return for worsening symptoms and is stable at the time of discharge. The patient verbalizes understanding. Guidance was provided on appropriate use of medications including driving under the influence, overdose, and side effects.         FINAL IMPRESSION    ICD-10-CM   1. Advice given about 2019-nCoV infection Active Z71.89   2. Cough Active R05            This dictation was created using voice recognition software. The accuracy of the dictation is limited to the abilities of the software. I expect there may be some errors of grammar and possibly content. The nursing notes were reviewed and certain aspects of this information were incorporated into this note.    Electronically signed by: Seferino Best II, M.D., 5/26/2020 8:03 PM

## 2020-05-27 NOTE — DISCHARGE INSTRUCTIONS
You may have Covid 19. Your test could take 2-3 days to return. Before returning to work please make sure of the following:     At least 3 days (72 hours) have passed since recovery defined as resolution of fever without the use of fever-reducing medications and improvement in respiratory symptoms (e.g., cough, shortness of breath); and,  At least 10 days have passed since symptoms first appeared    If you develop significant shortness of breath, meaning that it is difficult for you to walk even short distances without having to stop and catch your breath, or you become severely dizzy and this is persistent then please return to the emergency department.

## 2020-05-27 NOTE — ED NOTES
Pt d/c home. IV removed. After care discussed with pt. Pt verbalizes understandings. Pt ambulated out of ED independently

## 2020-05-28 LAB
SARS-COV-2 RNA RESP QL NAA+PROBE: NOT DETECTED
SPECIMEN SOURCE: NORMAL

## 2020-05-29 NOTE — ED NOTES
COVID-19 Test Follow Up  05/29/20      Patient tested negative for COVID-19. I have informed the patient of the result by My chart message. Encouraged to stay at home until no fever for 72 hours without the use of fever reducing medications and symptoms improving. Informed there is no need to further self-isolate for 14 days for COVID-19 unless otherwise directed by the Health Dept.     2019-nCoV Source  NP Swab     2019-nCoV RNA Interp  Not detected          Tenisha Menjivar, PharmD

## 2021-03-15 DIAGNOSIS — Z23 NEED FOR VACCINATION: ICD-10-CM

## 2025-01-28 ENCOUNTER — TELEPHONE (OUTPATIENT)
Dept: HEALTH INFORMATION MANAGEMENT | Facility: OTHER | Age: 60
End: 2025-01-28
Payer: COMMERCIAL

## 2025-02-07 ENCOUNTER — TELEPHONE (OUTPATIENT)
Dept: HEALTH INFORMATION MANAGEMENT | Facility: OTHER | Age: 60
End: 2025-02-07

## 2025-07-03 ENCOUNTER — TELEPHONE (OUTPATIENT)
Dept: HEALTH INFORMATION MANAGEMENT | Facility: OTHER | Age: 60
End: 2025-07-03
Payer: COMMERCIAL

## 2025-07-03 NOTE — TELEPHONE ENCOUNTER
Outcome: No Answer/No Message    Patient has a CT-Lung Cancer Screening Order ready to schedule. Need to verify eligibility before scheduling.    Transfer to [facility] Outbound [phone].    Attempt: 1

## 2025-07-11 NOTE — TELEPHONE ENCOUNTER
Outcome: No Answer/No Message     Patient has a CT-Lung Cancer Screening Order ready to schedule. Need to verify eligibility before scheduling.     Transfer to [facility] Outbound [phone].     Attempt: 2

## 2025-08-16 ENCOUNTER — HOSPITAL ENCOUNTER (OUTPATIENT)
Facility: MEDICAL CENTER | Age: 60
End: 2025-08-16
Payer: COMMERCIAL

## 2025-08-16 LAB
25(OH)D3 SERPL-MCNC: 17 NG/ML (ref 30–100)
ALBUMIN SERPL BCP-MCNC: 3.8 G/DL (ref 3.2–4.9)
ALBUMIN/GLOB SERPL: 1.7 G/DL
ALP SERPL-CCNC: 93 U/L (ref 30–99)
ALT SERPL-CCNC: 14 U/L (ref 2–50)
ANION GAP SERPL CALC-SCNC: 12 MMOL/L (ref 7–16)
AST SERPL-CCNC: 18 U/L (ref 12–45)
BASOPHILS # BLD AUTO: 0.6 % (ref 0–1.8)
BASOPHILS # BLD: 0.05 K/UL (ref 0–0.12)
BILIRUB SERPL-MCNC: 0.8 MG/DL (ref 0.1–1.5)
BUN SERPL-MCNC: 15 MG/DL (ref 8–22)
CALCIUM ALBUM COR SERPL-MCNC: 9 MG/DL (ref 8.5–10.5)
CALCIUM SERPL-MCNC: 8.8 MG/DL (ref 8.5–10.5)
CHLORIDE SERPL-SCNC: 107 MMOL/L (ref 96–112)
CHOLEST SERPL-MCNC: 89 MG/DL (ref 100–199)
CO2 SERPL-SCNC: 22 MMOL/L (ref 20–33)
CREAT SERPL-MCNC: 1.03 MG/DL (ref 0.5–1.4)
EOSINOPHIL # BLD AUTO: 0.25 K/UL (ref 0–0.51)
EOSINOPHIL NFR BLD: 2.9 % (ref 0–6.9)
ERYTHROCYTE [DISTWIDTH] IN BLOOD BY AUTOMATED COUNT: 44.8 FL (ref 35.9–50)
GFR SERPLBLD CREATININE-BSD FMLA CKD-EPI: 83 ML/MIN/1.73 M 2
GLOBULIN SER CALC-MCNC: 2.2 G/DL (ref 1.9–3.5)
GLUCOSE SERPL-MCNC: 122 MG/DL (ref 65–99)
HCT VFR BLD AUTO: 55.1 % (ref 42–52)
HDLC SERPL-MCNC: 31 MG/DL
HGB BLD-MCNC: 18.6 G/DL (ref 14–18)
IMM GRANULOCYTES # BLD AUTO: 0.02 K/UL (ref 0–0.11)
IMM GRANULOCYTES NFR BLD AUTO: 0.2 % (ref 0–0.9)
LDLC SERPL CALC-MCNC: 28 MG/DL
LYMPHOCYTES # BLD AUTO: 2.04 K/UL (ref 1–4.8)
LYMPHOCYTES NFR BLD: 24 % (ref 22–41)
MCH RBC QN AUTO: 31.9 PG (ref 27–33)
MCHC RBC AUTO-ENTMCNC: 33.8 G/DL (ref 32.3–36.5)
MCV RBC AUTO: 94.5 FL (ref 81.4–97.8)
MONOCYTES # BLD AUTO: 0.7 K/UL (ref 0–0.85)
MONOCYTES NFR BLD AUTO: 8.2 % (ref 0–13.4)
NEUTROPHILS # BLD AUTO: 5.45 K/UL (ref 1.82–7.42)
NEUTROPHILS NFR BLD: 64.1 % (ref 44–72)
NRBC # BLD AUTO: 0 K/UL
NRBC BLD-RTO: 0 /100 WBC (ref 0–0.2)
PLATELET # BLD AUTO: 180 K/UL (ref 164–446)
PMV BLD AUTO: 10.3 FL (ref 9–12.9)
POTASSIUM SERPL-SCNC: 4.1 MMOL/L (ref 3.6–5.5)
PROT SERPL-MCNC: 6 G/DL (ref 6–8.2)
RBC # BLD AUTO: 5.83 M/UL (ref 4.7–6.1)
SODIUM SERPL-SCNC: 141 MMOL/L (ref 135–145)
TRIGL SERPL-MCNC: 148 MG/DL (ref 0–149)
TSH SERPL DL<=0.005 MIU/L-ACNC: 1.68 UIU/ML (ref 0.38–5.33)
WBC # BLD AUTO: 8.5 K/UL (ref 4.8–10.8)

## 2025-08-16 PROCEDURE — 82306 VITAMIN D 25 HYDROXY: CPT

## 2025-08-16 PROCEDURE — 80053 COMPREHEN METABOLIC PANEL: CPT

## 2025-08-16 PROCEDURE — 80061 LIPID PANEL: CPT

## 2025-08-16 PROCEDURE — 84443 ASSAY THYROID STIM HORMONE: CPT

## 2025-08-16 PROCEDURE — 85025 COMPLETE CBC W/AUTO DIFF WBC: CPT

## 2025-08-16 PROCEDURE — 36415 COLL VENOUS BLD VENIPUNCTURE: CPT

## 2025-08-16 PROCEDURE — 86803 HEPATITIS C AB TEST: CPT

## 2025-08-18 LAB — HCV AB SER QL: NORMAL
